# Patient Record
Sex: FEMALE | Race: WHITE
[De-identification: names, ages, dates, MRNs, and addresses within clinical notes are randomized per-mention and may not be internally consistent; named-entity substitution may affect disease eponyms.]

---

## 2017-05-05 ENCOUNTER — HOSPITAL ENCOUNTER (INPATIENT)
Dept: HOSPITAL 84 - D.LDO | Age: 27
LOS: 2 days | Discharge: HOME | End: 2017-05-07
Attending: OBSTETRICS & GYNECOLOGY | Admitting: OBSTETRICS & GYNECOLOGY
Payer: MEDICAID

## 2017-05-05 VITALS — SYSTOLIC BLOOD PRESSURE: 109 MMHG | DIASTOLIC BLOOD PRESSURE: 62 MMHG

## 2017-05-05 VITALS — DIASTOLIC BLOOD PRESSURE: 57 MMHG | SYSTOLIC BLOOD PRESSURE: 105 MMHG

## 2017-05-05 VITALS
BODY MASS INDEX: 42.59 KG/M2 | WEIGHT: 249.5 LBS | BODY MASS INDEX: 42.59 KG/M2 | HEIGHT: 64 IN | HEIGHT: 64 IN | WEIGHT: 249.5 LBS

## 2017-05-05 VITALS — DIASTOLIC BLOOD PRESSURE: 62 MMHG | SYSTOLIC BLOOD PRESSURE: 109 MMHG

## 2017-05-05 VITALS — DIASTOLIC BLOOD PRESSURE: 65 MMHG | SYSTOLIC BLOOD PRESSURE: 111 MMHG

## 2017-05-05 VITALS — SYSTOLIC BLOOD PRESSURE: 112 MMHG | DIASTOLIC BLOOD PRESSURE: 65 MMHG

## 2017-05-05 VITALS — DIASTOLIC BLOOD PRESSURE: 58 MMHG | SYSTOLIC BLOOD PRESSURE: 106 MMHG

## 2017-05-05 VITALS — DIASTOLIC BLOOD PRESSURE: 60 MMHG | SYSTOLIC BLOOD PRESSURE: 129 MMHG

## 2017-05-05 VITALS — DIASTOLIC BLOOD PRESSURE: 72 MMHG | SYSTOLIC BLOOD PRESSURE: 132 MMHG

## 2017-05-05 VITALS — SYSTOLIC BLOOD PRESSURE: 102 MMHG | DIASTOLIC BLOOD PRESSURE: 55 MMHG

## 2017-05-05 VITALS — SYSTOLIC BLOOD PRESSURE: 94 MMHG | DIASTOLIC BLOOD PRESSURE: 83 MMHG

## 2017-05-05 DIAGNOSIS — R00.0: ICD-10-CM

## 2017-05-05 DIAGNOSIS — Z3A.34: ICD-10-CM

## 2017-05-05 LAB
AMPHETAMINES UR QL SCN: NEGATIVE QUAL
APPEARANCE UR: (no result)
BARBITURATES UR QL SCN: NEGATIVE QUAL
BENZODIAZ UR QL SCN: NEGATIVE QUAL
BILIRUB SERPL-MCNC: NEGATIVE MG/DL
BZE UR QL SCN: NEGATIVE QUAL
CANNABINOIDS UR QL SCN: NEGATIVE QUAL
COLOR UR: YELLOW
ERYTHROCYTE [DISTWIDTH] IN BLOOD BY AUTOMATED COUNT: 13.7 % (ref 11.5–14.5)
GLUCOSE SERPL-MCNC: NEGATIVE MG/DL
HCT VFR BLD CALC: 36.1 % (ref 36–48)
HGB BLD-MCNC: 11.6 G/DL (ref 12–16)
KETONES UR STRIP-MCNC: (no result) MG/DL
LEUKOCYTE ESTERASE: NEGATIVE
MCH RBC QN AUTO: 30.2 PG (ref 26–34)
MCHC RBC AUTO-ENTMCNC: 32.1 G/DL (ref 31–37)
MCV RBC: 94 FL (ref 80–100)
NITRITE UR-MCNC: NEGATIVE MG/ML
OPIATES UR QL SCN: NEGATIVE QUAL
PCP UR QL SCN: NEGATIVE QUAL
PH UR STRIP: 7 [PH] (ref 5–6)
PLATELET # BLD: 299 10X3/UL (ref 130–400)
PMV BLD AUTO: 9.6 FL (ref 7.4–10.4)
PROT UR-MCNC: NEGATIVE MG/DL
RBC # BLD AUTO: 3.84 10X6/UL (ref 4–5.4)
SP GR UR STRIP: 1.02 (ref 1–1.02)
UDS - METH: NEGATIVE QUAL
UROBILINOGEN UR-MCNC: NORMAL MG/DL
WBC # BLD AUTO: 9.7 10X3/UL (ref 4.8–10.8)

## 2017-05-05 NOTE — NUR
DR WINTER NOTIFIED OF PT'S DECREASED URINE OUTPUT. ORDER REC'D TO INFUSE A
500ML BOLUS AND REPORT BACK.

## 2017-05-05 NOTE — NUR
IVAC SOUNDING. THIS RN TO BEDSIDE. 500ML BOLUS COMPLETE. PT RESTING W/EYES
CLSOED. RESP EVEN. NS W/20 UNITS PITOCIN RESTARTED AT 125ML/HR. 30ML URINE
NOTED IN UROMETER.

## 2017-05-05 NOTE — NUR
THIS RN TO BEDSIDE TO INITATE 500ML NS BOLUS PER MD ORDER. PT AWAKE, WITH
OCCASIONAL PERIODS OF DROWZINESS. PT CONTINUES TO RATE HER PAIN 5/10. PT HAS
PCA BUTTON AT SIDE AND CONTINUES TO PUSH IT FOR PAIN. V/S OBTAINED. SEE
FLOWSHEET. PT PERFORMS I.S  X 3. COUGHS W/POOR EFFORT. REPOSITIONS SELF UP IN
BED. UROMETER EMPTIED AT THIS TIME. FUNDUS FIRM,U/U, SMALL RUBRA LOCHIA NOTED.
CLEAN PERIPAD PLACED. SCD WRAPS REMAIN ON BILATERALLY. CONNECTED TO PUMP. PT
REMAINS ON. PT ENCOURAGED TO CONTINUE DRINK PO FLUIDS TO HELP WITH URINE
OUTPUT. PT IS AGREEABLE. PT'S BED LOW, SIDE RAILS UP X 2. CALL LIGHT AND PHONE
AT PT'S SIDE. SPOUSE IN ROOM. INFANT QUIET IN CRIB AT BEDSIDE.

## 2017-05-05 NOTE — NUR
THIS RN TO BEDSIDE. PT CURRENTLY SITTING UP IN BED W/INFANT UP IN ARMS.
CONTINUES TO RATE PAIN 5/10. PT CONTINUES TO USE PCA BUTTON. PT IFNORMED THAT
SHE MAY AHVE ADDITIONAL DOSE OF TORDAL IN 1 HR. URINE OUTPUT NOTED TO BE 60ML
TOTAL FOR PAST [id number]. PT DENEIS NEEDS AT THIS TIME.

## 2017-05-05 NOTE — NUR
THIS RN TO BEDSIDE. PT CURRENTLY RESTING W/EYES CLOSED IN HIGH REYES'S. I.S
PROVIDED W/TEACHING. PT PERFORMS I.S X 3 ABLE TO PULL 2000. PT COUGH W/POOR
EFFORT. HOB LOWERED. PT ROLLS FROM SIDE TO SIDE W/MODERATE ASSISTANCE. FUNDUS
FIRM,U/U. SMALL RUBRA LOCHIA NOTED TO PAD. PERICARE DONE. CLEAN PAD PLACED.
PT RETURNS TO HIGH REYES'S PER PREFERNCE FOR COMFORT. APPROX 20ML URINE
OUTPUT NOTED FOR PAST 1 HR. WILL CONTINUE TO MONITOR. PT'S CLEAR LIQUID
LUNCH TRAY PLACED IN FRONT OF HER. PT HAS DRANK APPROX 300ML WATER FROM [facility]
MUG. MUG REFILLED AND PLACED ON PT'S TABLE WITHIN HER REACH. NO NEEDS VOICED
AT THIS TIME.

## 2017-05-05 NOTE — NUR
DR. WINTER CALLED AND INFORMED OF PT. CURRENT STATUS AND INQUIRED IF PT.
COULD BEGIN ORAL MEDS AND START AMBULATION. INFORMED THAT ORAL PAIN MED ORDERS
WERE ALREADY ENTERED PER DR. FARNSWORTH. MD STATES TO PROCEDE.

## 2017-05-05 NOTE — NUR
ANITHA (LACTATION CONST) AND TU JEWELLRN AT BEDSIDE CURRENTLY ATTEMPTING TO
GET INFANT LATCHED FOR NURSING. PT REPORTS INCREASED PAIN WHEN SITTING UP. TU JEWELLRN PROVIDES PT W/PCA BUTTON SO PT MAY PUSH IT AT THIS TIME. APPROX
95ML URINE NOTED IN UROMETER AND DUMPED TO LEONE BAG. TORADOL 30MG SIVP GIVEN.
SEE EMAR.

## 2017-05-05 NOTE — NUR
PT. SITTING UP IN BED WITH HOB ELEVATED 45 DEGREES WITH MEAL TRAY IN FRONT OF
PT. PT. STATES SHE ONLY ATE JELLO. MORE OFFERED AND ACCEPTED. JELLO GIVEN TO
PT.  RATES PAIN AS A 2-3 OF 10 ON PAIN SCALE. BREATH SOUNDS CLEAR AND BOWEL
SOUNDS AUDIBLE. SCDS ON AND FUNCTIONAL. DEMONSTATED COUGHING AND DEEP
BREATHING AND ALSO USE OF INCENTIVE SPIROMETER AND ABLE TO ACHIEVE TO 2000. IV
NOTED IN RT WRIST AREA WITHOUT REDNESS NOR EDEMA AT IV SITE. IV OF NS WITH 20U
PITOCIN ADDED INFUSING AT 125CC/HR. INFANT IN OPEN CRIB AT BEDSIDE. PT. STATES
SHE STILL FEELS VERY DROWSY. FOB LYING ON SOFA. LIGHTS IN ROOM DIMMED. PT.
SPEAKING IN VERY LOW TONE. LEONE PATENT AND DRAINING WITH 65CC NOTED IN URINE
METER.

## 2017-05-05 NOTE — NUR
REC'D PT BACK FROM [facility] POST-OP PRIMARY  VIA BED. PT DROWZY,
FREQUENTLY SLEEPY. PT UNABLE TO VERBALIZE/REPORT A NUMBER FOR PAIN SCALE. FACE
SCALE USED AT THIS TIME. PAIN RATES 3/10 PER THIS RN. PT'S BREATHSOUNDS CL/=,
ABD SOFT,NON-DISTENDED. FUNDUS FIRM,U/U, SMALL RUBRA LOCHIA NOTED TO CURRENT
PERIPAD. CLEAN PAD PLACED AT THIS TIME. LOW TRANSVERSE INCISION W/LARGE ABD
DRESSING IN PLACE. C/D/I. LEONE CATH IN PLACE DRAINING VIA GRAVITY AT BEDSIDE.
APPROX 500ML NOTED IN BAG. BAG EMPTIED AT THIS TIME. PEDAL PULSES PRESENT X 2.
GENERALIZED, NON PITTING EDEMA NOTED TO LOWER EXTREMITIES. SCD WRAPS IN PLACED
BILATERALLY. CONNECTED TO PUMP. PUMP IS ON AND FUNTIONING.PT HAS A PIV TO RT
WRIST W/18GUAGE. NS W/20 UNITS PITOCIN CURRENTLY CONNECTED INFUSING AT SLOWR
ATE VIA GRAVITY. APPROX 200ML REMAINS IN BAG. BAG PLACED ON PUMP AT THIS TIME
TO INFUSE AT 125ML/HR. DEMEROL PCA CONNECTED TO IV SITE AND PCA PUMP SET PER
MD ORDERS. REPORT REC'D FROM RECOVERY NURSE.

## 2017-05-05 NOTE — NUR
THIS RN TO BEDSIDE. PT SITTING IN HIGH REYES'S MORE AWAKE AND ALERT, BUT
REPORT SHE STILL DOZES OFF OCCAISIONALLY. PT CONTINUES TO RATE HER PAIN 5/10
W/INCREASED PAIN W/MOVEMENT. PT DOES NOT APPEAR TO BE IN DISTRESS. PT HAS
DRANK APPROX 200ML OF WATER AND APPLE JUICE. HAS NO TOUCHED HER CLEAR LIQUID
LUNCH TRAY. APPROX 20ML URINE NOTED FOR THE PAST 1 HR. WILL REPORT TO ON CALL
MD. PT DENIES NEEDS AT PRESENT. FOB RETURNS TO ROOM AT THIS TIME.

## 2017-05-05 NOTE — NUR
THIS RN TO BEDSIDE. UPON ENTERING THE ROOM, PT IS RESTING QUIETLY IN HIGH
REYES'S W/EYES CLOSED. RESP EVEN AND UNLABORED. NO APPARENT DISTRESS NOTED.
PT AWAKENED FOR PAIN ASSESSMENT. PT REPORTS PAIN 3/10. STATES "BUT I'M STILL
HAVING CTX'S THAT ARE BAD." TORADOL 30MG SIVP GIVEN FOR PT C/O CRAMPING. PT
TURNS FROM SIDE TO SIDE W/ASSISTANCE. CLEAN CHUX AND TOWEL PLACED. FUNDUS
FIRM,U/1, SMALL LOCHIA W/1 QUARTER SIZED BLOOD CLOT NOTED. CLEAN PERIPAD
PLACED. APPROX 95ML URINE NOTED IN UROMETER. PT'S HOB ELEVATED TO 30 DEGREES.
BED LOW, SIDE RAILS UP X 2. CALL LIGHT AND PCA BUTTON AT PT'S SIDE.

## 2017-05-05 NOTE — NUR
PT BREASTFEEDING INFANT AT THIS TIME. MEL WRIGHT RN AT BEDSIDE ALSO. UPDATED
PT ON PLAN OF CARE, VERBALIZED UNDERSTANDING. INSTRUCTED TO USE CL TO NOTIFY
RN WHEN SHE COMPLETED BREASTFEEDING. PT NODDED IN AGREEMENT. PCA AND IV FLUIDS
TURNED OFF, WILL REMOVE LEONE AND SL PIV WHEN PT COMPLETES BREASTFEEDING. 3
MLS USED FROM PCA WITH 254 MLS IV FLUIDS INFUSED SINCE LAST CLEARED.

## 2017-05-05 NOTE — NUR
ROUNDS MADE. PT REQUESTING PAIN MEDICATION. PAIN 7/10. RN DISCUSSED DIFFERENT
STRENGTHS ON PERCOCET AND POSSIBLE SIDE EFFECTS. VERBALIZED UNDERSTANDING.
PERCOCET 10/325 TAKEN TO PT PER ORDER AND PT PAIN RATING AND REQUEST. UPON
ADMINISTERING MEDICATION PT REPORTS THAT "I AM SENSITIVE TO PAIN MEDICINE."
REQUESTS TO TAKE HALF OF PILL AT THIS TIME. PT BROKE PILL IN HALF AND STATES
THAT SHE IS AFRAID TO TAKE FULL PILL AT THIS TIME. HALF OF PILL WASTED WITH MEL WRIGHT RN IN THE SHARPS. NEW ICE PACK GIVEN AND APPLIED TO INCISION SITE,
DRSG REMAINS CLEAN, DRY, AND INTACT. ICE WATER GIVEN. LINENS AND PILLOW
PROVIDED TO S/O. DENIES ADDITIONAL NEEDS AT THIS TIME. REQUESTS INFANT BE
TAKEN TO NBN UNTIL TIME FOR NEXT FEEDING, THIS RN TAKES INFANT TO NBN. WILL
CONT TO MONITOR AND ASSIST PRN.

## 2017-05-05 NOTE — NUR
THIS RN TO BEDSIDE. PT CURRENTLY RESTING QUIETLY W/EYES CLOSED. RESP EVEN. PT
OPENS EYES SPONTANEOUSLY W/VERBAL STIMULUS. PAIN ASSESSED. PT RATES PAIN
2-3/10. V/S OBTAINED. SEE FLOWSHEET. APPROX 60ML URINE NOTED IN UROMETER AND
DUMPED INTO BAG. PT PERFORMS I.S. X3. COUGHS W/POOR EFFORT. FRESH ICE WATER
SERVED IN [facility] MUG.

## 2017-05-05 NOTE — NUR
PAIN REASSESSMENT COMPLETED. PT REPORTS THAT PAIN IS 4/10. STATES THAT
CRAMPING IS INTENSE AT TIMES. DENIES NEED FOR ADDITIONAL INTERVENTION AT THIS
TIME. S/O AT BEDSIDE, HOLDING PT HAND AT THIS TIME. BED IN LOW POSITION WITH
UPPER SIDE RAILS RAISED X2. CL AND PHONE WITHIN REACH. WILL CONT TO MONITOR
AND ASSIST PRN.

## 2017-05-05 NOTE — NUR
Keri Porter
16
 
 
O: Patient in bed holding infant, attempting first feeding, FOB at bedside,
nursery nurse in room to assist patient.
Explain to patient how to hold infant for feeding, turn tummy to tummy, nose
opposite of nipple, allow infant to self latch. Showed and explained how to
apply nipple shield correctly. Patient is drowsy due to meds. Explain to FOB
how important it is to stand by patient and help hold infant up, to make sure
infant doesn't fall, if patient relaxes her arms. Infant latches on the right
breast at 10:16, in laid back breastfeeding position, mouth 140 degrees,
sucking in a rocking motion, round checks, mother and infant both appear
content. Encouraged to allow infant to remain latch as long as he is willing
to suck, when infant comes off the breast, or completely falls asleep, try
stimulating infant by touch, burp and offer other breast. Patient states
verbally understands. Will follow up, CLC left patient in room with nursery
nurse.
 
 
A: Client delivered by , requested nipple shield, attempting infant
first feeding.
 
P: Continue to support breastfeeding.
 
Yolanda Herring, GOSIA

## 2017-05-05 NOTE — NUR
FUNDUS FIRM,U/U, SCANT LOCHIA NOTED. ICE CAP TO ABD. PT REMAINS DROWZY. NBN
NURSE TO ROOM W/INFANT AT THIS TIME.

## 2017-05-05 NOTE — NUR
THIS RN TO BEDSIDE. PT CURRENTLY HOLDING INFANT AFTER NURSING. PT CONTINUES TO
BE DROWZY. INFANT TRANSFERED TO CRIB TO BE SWADDLED THEN PLACED IN FOB'S ARMS.
PT RATES PAIN 5/10. PT HAS BEEN INSTRUCTED ON HOW TO USE HER PCA BUTTON AND
HAS BEEN PUSHING IT. FUNUDS FIRM,U/U, SMALL RUBRA LOCHIA NOTED ON PERIPAD. PAD
CHANGED AT THIS TIME. APPROX 20ML URINE NOTED IN UROMETER. PT ENCOURAGED TO
INCREASE WATER INTAKE. V/S OBTAINED AND RECORDED. SEE FLOWSHEET. PT INFORMED
THAT ONCE SHE BECOMES MORE ALERT, INCENTIVE SPIROMETER TEACHING WILL BE GIVEN
AND PT WILL HAVE TO BEGIN T/C/D. PT'S SPOUSE REMAINS AT BEDSIDE. ICE CAP
FRESHENED AND PLACED OVER INCISION SITE. FRESH ICE WATER SERVED AND PLACED AT
PT'S BEDSIDE. PT BED IN LOW POSITION. SIDE RAILS UP X 2. CALL LIGHT, PHONE AND
PCA BUTTON AT PT'S SIDE.

## 2017-05-05 NOTE — NUR
PT CONTINUES TO REPORT "BAD CRAMPING" TO LOWER ABD. DISCUSSED ADDITIONAL DOSE
OF TORADOL PER ORDERS. PT AGREEABLE. MEDICATION FREQUENY AND SIDE EFFECTS
DISCUSSED WITH PT AND S/O, VERBALIZED UNDERSTANDING, STATES THAT SHE HAS HAD
MEDICATION PREVIOUSLY AND IT DID HELP WITH PAIN GREATLY. NBN RN BRINGS INFANT
TO ROOM FOR BREASTFEEDING. RN ASSISTED PT WITH SCOOTING UP IN BED FOR BREAST
FEEDING, STATES THAT SHE ALSO HAS DISCOMFORT TO COCCYX. PILLOW PLACED UNDER
LEFT HIP TO ASSIST WITH DISPLACING PRESSURE, VERBALIZED RELIEF. S/O REMAINS AT
BEDSIDE, HOLDING INFANT. ASSISTANCE WITH BREASTFEEDING OFFERED AND DECLINED,
PT REPORTS THAT SHE WILL NOTIFY RN IF ASSISTANCE IS NEEDED WITH CL. BED IN LOW
POSITION WITH UPPER SIDE RAILS RAISED X2. CL AND PHONE WITHIN REACH. S/O
REMAINS AT BEDSIDE. ICE WATER PROVIDED FOR S/O PER REQUEST.

## 2017-05-05 NOTE — NUR
PT REMAINS DROWZY, FACE SCALE CONTINUED TO BE USED FOR PAIN ASSESSMENT.
 PAIN ASSESSED AT  3/10. FUNDUS FIRM,U/U, SCANT LOCHIA NOTED. V/S STABLE.

## 2017-05-05 NOTE — NUR
PT CALLED OUT VIA CL, STATES THAT SHE HAS FINISHED BREASTFEEDING. MEL WRIGHT RN TO BEDSIDE ALSO. JAYDEN CASTILLO SL PIV. MEL WRIGHT RN REMOVED LEONE WITH NO
DIFFICULTY, 165 MLS OUT FROM LEONE NOTED. PT TOLERATED WELL. DINNER TRAY
REMOVED FROM ROOM. S/O REMAINS AT BEDSIDE, SUPPORTIVE OF PT. PT DENIES
ADDITIONAL NEEDS AT THIS TIME. PT WITH ICE PACK ON TOP OF GOWN TO LOWER ABD
COVERING INCISION SITE. BED IN LOW POSITION WITH UPPER SIDE RAILS RAISED X2.
CL AND PHONE WITHIN PT REACH. S/O REMAINS AT BEDSIDE.

## 2017-05-06 VITALS — SYSTOLIC BLOOD PRESSURE: 98 MMHG | DIASTOLIC BLOOD PRESSURE: 55 MMHG

## 2017-05-06 VITALS — SYSTOLIC BLOOD PRESSURE: 95 MMHG | DIASTOLIC BLOOD PRESSURE: 50 MMHG

## 2017-05-06 VITALS — SYSTOLIC BLOOD PRESSURE: 116 MMHG | DIASTOLIC BLOOD PRESSURE: 66 MMHG

## 2017-05-06 VITALS — DIASTOLIC BLOOD PRESSURE: 61 MMHG | SYSTOLIC BLOOD PRESSURE: 102 MMHG

## 2017-05-06 VITALS — DIASTOLIC BLOOD PRESSURE: 60 MMHG | SYSTOLIC BLOOD PRESSURE: 107 MMHG

## 2017-05-06 LAB
ERYTHROCYTE [DISTWIDTH] IN BLOOD BY AUTOMATED COUNT: 13.4 % (ref 11.5–14.5)
HCT VFR BLD CALC: 32.8 % (ref 36–48)
HGB BLD-MCNC: 10.6 G/DL (ref 12–16)
MCH RBC QN AUTO: 29.9 PG (ref 26–34)
MCHC RBC AUTO-ENTMCNC: 32.3 G/DL (ref 31–37)
MCV RBC: 92.7 FL (ref 80–100)
PLATELET # BLD: 242 10X3/UL (ref 130–400)
PMV BLD AUTO: 9.5 FL (ref 7.4–10.4)
RBC # BLD AUTO: 3.54 10X6/UL (ref 4–5.4)
WBC # BLD AUTO: 18.7 10X3/UL (ref 4.8–10.8)

## 2017-05-06 NOTE — NUR
ROUNDS MADE. INFANT IN CRIB AT BEDSIDE. PT IN HIGH FOWLERS POSITION WATCHING
TV. PAIN 2-3/10. RN ASSISTED PT UP TO VOID. LINEN CHANGE DONE. ENCOURAGED PT
TO SHOWER, DECLINED AT THIS TIME. STATES THAT SHE JUST WANTS TO GO TO SLEEP.
PT REPORTS THAT SHE IS GOING TO TAKE INFANT TO NBN AND WALK BACK TO ROOM TO
REST. S/O SUPPORTIVE OF PT. WILL CONT TO MONITOR AND ASSIST PRN.

## 2017-05-06 NOTE — NUR
Pt and spouse amb in hallway with pt pushing infant in crib, she is able to
walk the entire length of unit hallway.  Rates her pain at 2/10 at this time.
Back to room with spouse and infant.

## 2017-05-06 NOTE — NUR
To room for am assessment, pt is awake at this time.  When asked for her pain
level she states "ok as long as I don't move." when asked for a number of 0-10
as on pain scale she states "maybe a 4".  VS as charted on graphic and
assesment completed on flowsheet. Abd soft to touch, fundus firm with massage
at u/u with light bleeding to dago pad.  Pt states that yes she is passing gas
and has not noticed any clots with voids.  Encouraged her to try and get up to
void every couple of hours this will help with pain control, she states her
understanding to this and for her to call for nurse is any assistance is
needed getting up from bed.  IV saline to right wrist patent and no reddness
and denies pain with touch. SCD on bilat lower extremities and pump is on.
Denies any questions or concerns at this time. Call light is with in her reach
and side rails up x 2.

## 2017-05-06 NOTE — NUR
Pt sitting up in chair at bedside holding infant. Denies any needs at this
time. Call light and phone within patients reach and spouse at bedside.

## 2017-05-06 NOTE — NUR
RN TO BEDSIDE. PT REQUESTING ASSISTANCE WITH BREASTFEEDING, STATES THAT SHE
CAN'T GET INFANT TO WAKE UP. NBN RN TO BEDSIDE TO ASSIST WITH BREASTFEEDING.
PT REQUESTS TO CONTINUE TO WAIT FOR ASSESSMENT. RN WILL CONT TO MONITOR. S/O
REMAINS AT BEDSIDE. BED IN LOW POSITION WITH UPPER SIDE RAILS RAISED X2. CL
AND PHONE WITHIN REACH.

## 2017-05-06 NOTE — NUR
PT CALLS VIA CL, REQUEST THAT INFANT BE TAKEN TO NBN. SCD'S PLUGGED BACK IN.
S/O NOT AT BEDSIDE AT THIS TIME. PT DENIES NEEDS. STATES THAT SHE IS GOING TO
REST. BED IN LOW POSITION WITH UPPER SIDE RAILS RAISED X2. CL AND PHONE
WITHIN REACH. PT VERBALIZED THAT SHE WILL USE CL FOR ASSISTANCE IF NEEDED.
WILL CONT TO MONITOR AND ASSIST PRN.

## 2017-05-06 NOTE — NUR
PT. REQUESTED INFANT RETURN TO NBN SO SHE CAN SLEEP.  CURRENTLY POSITIONED IN
RT TILT SUPPORTED WITH PILLOWS.

## 2017-05-06 NOTE — NUR
PT. CONTINUES TO SIT ON SIDE OF BED. REQUESTED PAIN MED FOR PAIN SHE RATES AS
A 5 OF 10 ON PAIN SCALE. PAIN MED GIVEN AS REQUESTED.

## 2017-05-06 NOTE — NUR
REPORT REC'D FROM YAJAIRA TURCIOS RN. PT IN SEMI FOWLERS POSITION RESTING WITH EYES
CLOSED. RESPIRATIONS REGULAR, NO S/S OF DISTRESS NOTED. SPOUSE AT BEDSIDE,
REQUESTS THAT RN LET PT CONTINUE TO REST, STATES "SHE JUST WENT TO SLEEP ABOUT
30 MINUTES AGO." INSTRUCTED SPOUSE TO NOTIFY RN OF NEEDS, VERBALIZED
UNDERSTANDING. BED IN LOW POSITION WITH UPPER SIDE RAILS RAISED X2. CL AND
PHONE WITHIN REACH. WILL CONT TO MONITOR AND ASSIST PRN.

## 2017-05-06 NOTE — NUR
Pt calls for nurse, this rn to room.  Ask for pain med for incision pain that
she rates at 5/10. Also ask for Abd Binder so that she and spouse can walk in
halls.

## 2017-05-06 NOTE — NUR
Infant taken to nbn via crib for Pedi assessment.  Pt is sitting up in bed
visiting with friends, when asked about pain level pt responds with "its ok"
reminded her that she does have meds for pain control which are not scheduled
but something she will need to let nurses know she needs, responds with
nodding her head.  Side rails up x 2 with call light in reach.

## 2017-05-06 NOTE — NUR
INTO PT. ROOM TO ASSIST PT. TO GET UP TO BATHROOM. PT. USING INCENTIVE
SPIROMETER WHEN NURSES ENTERED ROOM. PT. MORE AWAKE AND ALERT AT THIS TIME
THAN EARLIER IN SHIFT. SPEAKING TONE OF PT. LOUDER. STATES SHE FEELS URGE TO
VOID. TO SITTING POSITION ON SIDE OF BED WITH MINIMAL ASSISTANCE AND USING
PILLOW TO SPLINT ABD.  STANDING AT SIDE OF BED AND AMBULATED TO BATHROOM WITH
MINIMAL ASSISTANCE. STEADY, ALTHOUGH, SLOW GAIT. VOIDED 850CC. ASSISTED PT.
WITH PARISH CARE AND ASSISTED WITH PARISH PADS AND PANTIES APPLIED. BACK TO BED
AND POSITIONED TO LT SIDE AND SUPPORTED WITH PILLOW. SCDS REAPPLIED AND
FUNCTIONAL. PT. RATES PAIN AS A 4 OF 10 AFTER TORADOL INJECTION BUT REMARKS
THAT MEDICATION HELPED. ABD. DRESSING DRY AND INTACT AND LOCHIA RUBRA SCANT TO
MOD.  FOB REMAINS IN ROOM WITH PT.  ENCOURAGED TO CALL FOR ASSISTANCE NEXT
TIME SHE FELT SHE NEEDED TO VOID. SIDE RAILS UP X 2.

## 2017-05-06 NOTE — NUR
PT REPORTS THAT SHE COMPLETED BREASTFEEDING AND ALSO USED BREAST PUMP. SHIFT
ASSESSMENT COMPLETED. VSS. FUNDUS FIRM U2, SCANT RUBRA LOCHIA TO PERIPAD, NO
CLOTS PRESENT. PT REPORTS THAT BLEEDING HAS BEEN LIGHT ALL DAY. PERIPAD
PRESENT BETWEEN ABD FOLD AND INCISION, INCISION CLEAN, DRY, AND WELL
APPROXIMATED WITH STERISTRIPS INTACT, NO DRAINAGE PRESENT TO PERIPAD. BOWEL
SOUND PRESENT AND ACTIVE X4 QUADRANTS, REPORTS THAT SHE HAS BEEN PASSING
FLATUS. VOIDING WITHOUT DIFFICULTY. PAIN 2/10 INTERMITTENT ABD CRAMPING WITH
STINGING TO INCISION. DENIES NEED FOR PAIN MEDICATION OR INTERVENTION. SCD'S
ON. S/O AT BEDSIDE. BED IN LOW POSITION WITH UPPER SIDE RAILS RAISED X2. CL
AND PHONE WITHIN REACH. WILL CONT TO MONTIOR AND ASSIST PRN.

## 2017-05-06 NOTE — NUR
Pt up to void with, assistance needed getting up from bed.  Pt able to amb to
bathroom and preform incision care per herself.  Toothbrush/paste provided,
explained that she could shower whenever she wanted, states she was waiting
for her  to return.

## 2017-05-06 NOTE — NUR
Pain med given as charted on emar for abd/incision pain that she rates at
7/10. Pt is tearful at this time and questions "when will I feel better"
attempt to reassure her that her recovery time will vary but each day she will
notice difference, she does smile and nod her head.  Father of baby at bedside
and ask if nursery nurse comes to room to answer questions about breast
feeding.  Pt denies any other needs at this time. Side rails up x 2 with phone
and call light in reach.

## 2017-05-06 NOTE — NUR
PT. REQUESTED ASSISTANCE IN GETTING INFANT LATCHED FOR BREASTFEEDING. REPORTS
THAT SHE HAS BEEN USING BREAST SHIELD BUT BREAST ARE NOW SORE AND SHE DOES NOT
WANT TO USE. NIPPLES NOTED TO BE FLAT AND BREAST LARGE. ATTEMPTED TO ASSIST
PT. BUT INFANT UNABLE TO GET LATCHED. PT. SWITCHED BREAST AND NIPPLE ROLLED IN
ORDER TO GET NIPPLE MORE ERECT BUT INFANT STILL UNABLE TO LATCH DISPITE
NUMEROUS ATTEMPTS BY INFANT. SUGGESTED PT. MIGHT TRY PUTTING SHIELD ON UNTIL
INFANT NURSES LONG ENOUGH TO GET NIPPLE OUT FURTHER , THEN REMOVE SHIELD. PT.
REPORTS "IT IS JUST TOO PAINFUL". INQUIRED IF PT. HAD ANY DIFFICULTY WITH HER
PAST CHILDREN GETTING A GOOD LATCH. PT. REPORTS THAT SHE DID. PT. STATES "WE
WILL GET IT FIGURED OUT". ALSO ENCOURAGED PT. TO [employer] MILK TO NIPPLE
FOR INFANT TO TASTE AND PT. DID SAME BUT INFANT STILL UNABLE TO LATCH.

## 2017-05-06 NOTE — NUR
PAIN REASSESSMENT COMPLETED. 0/10. PT RESTING WITH EYES CLOSED, RESPIRATIONS
REGULAR, NO S/S OF DISTRESS NOTED. WILL CONT TO MONITOR. S/O REMAINS AT
BEDSIDE. BED IN LOW POSITION WITH UPPER SIDE RAILS RAISED X2. CL AND PHONE
WITHIN REACH.

## 2017-05-06 NOTE — NUR
Pain med as charted on emar with large ice water per her request.  Fitted for
abd binder and shown how to wear, encouraged her to wear when up out of bed
but remove when she gets back in bed.  After binder is placed she does state
that she feels more support.  Pt and spouse amb out of room with pt pushing
infant in crib, she is able to wall about half of unit hallway than back to
room.  Towels, dago pad/panties supplied and placed in bathroom for when she
is ready to shower, ask that nurse be called when she does this so that linens
on bed may be changed.

## 2017-05-06 NOTE — NUR
PT BACK TO ROOM FOLLOWING AMBULATING AROUND UNIT IN THE FARLEY. PAIN 5/10,
STINGING AND ACHING TO ABD INCISION CRAMPING. PT REQUESTS PAIN MEDICATION AND
TO TAKE MOTRIN WITH PRECOCET. PT C/O PAIN TO PIV SITE. REDNESS AND WARMTH
NOTED TO SITE, UNABLE TO FLUSH, PIV REMOVED. PT REFUSED RESITE. ICE WATER AND
PUDDING GIVEN. DENIES ADDITIONAL NEEDS AT THIS TIME. S/O AT BEDSIDE AND
SUPPORTIVE OF PT. BED IN LOW POSITION WITH UPPER SIDE RAILS RAISED X2. CL AND
PHONE WITHIN REACH. WILL CONT TO MONITOR AND ASSIST PRN.

## 2017-05-06 NOTE — NUR
Infant in nursery at this time, pt resting with eyes closed, responds to her
name being spoken. Denies needs at this time and states she just want to sleep
and will call if she needs anything. Side rails up x 2 with call light in
reach.

## 2017-05-06 NOTE — NUR
AMB. TO BATHROOM WITH MINIMAL ASSISTANCE. GAIT STEADY BUT SLOW. VOIDED AND
PARISH PAD CHANGED. LOCHIA RUBRA SCANT. PT. HOLDS PILLOW TO ABD. TO SPLINT WHEN
AMBULATORY. PT. SITTING ON SIDE OF BED. DESIRES TO JUST SIT ON BED ON OTHER
CHILDREN ARE IN ROOM. INFANT IN OPEN CRIB. ABD. DRESSING DRY AND INTACT.

## 2017-05-06 NOTE — NUR
ASSISTED UP TO BATHROOM. GAIT STEADY. VOIDED 700CC. PT. BACK TO SITTING ON
SIDE OF BED AND STATES SHE DESIRES TO SIT FOR AWHILE. ICE WATER PROVIDED AND
ICE CAP REFILLED. PT. USING INCENTIVE SPIROMETER AND ENCOURAGED TO TAKE DEEP
BREATHS. FOB AT BEDSIDE.

## 2017-05-07 VITALS — DIASTOLIC BLOOD PRESSURE: 64 MMHG | SYSTOLIC BLOOD PRESSURE: 106 MMHG

## 2017-05-07 VITALS — DIASTOLIC BLOOD PRESSURE: 57 MMHG | SYSTOLIC BLOOD PRESSURE: 108 MMHG

## 2017-05-07 VITALS — SYSTOLIC BLOOD PRESSURE: 103 MMHG | DIASTOLIC BLOOD PRESSURE: 58 MMHG

## 2017-05-07 NOTE — NUR
PT CALLS VIA CL. REQUEST THAT INFANT BE TAKEN TO NBN FOR HER TO SHOWER. RN
OFFERED ASSISTANCE, PT REQUESTS THAT SPOUSE ASSIST WITH SHOWER. LINEN CHANGE
COMPLETED.

## 2017-05-07 NOTE — NUR
PT CALLS VIA CL, STATES THAT SHE HAS COMPLETED BREASTFEEDING AND PUMPING.
ROOMING IN AGREEMENT DISCUSSED WITH PT AND S/O. VERBALIZE UNDERSTANDING. COPY
OF AGREEMENT GIVEN TO PT, PT READS AGREEMENT AND SIGNS. DENIES QUESTIONS AT
THIS TIME.

## 2017-05-07 NOTE — NUR
S/O TO DESK, REPORTS THAT PT COMPLETED PUMPING. VSS. PAIN 3/10 AT THIS TIME.
DENIES NEED FOR PAIN MEDICATION CURRENTLY. FUNDUS FIRM, U2, SCANT RUBRA
LOCHIA, NO CLOTS PRESENT.

## 2017-05-07 NOTE — NUR
AROUSED FROM SLEEP FOR BREASTFEEDING.  DENIES NEEDING ANYTHING AT THIS TIME.
WILL COMPLETE SHIFT ASSESSMENT WHEN FINISHED AND AFTER BREAKFAST.  SIDE RAILS
UP X 2, CALL LIGHT PLACED IN REACH.

## 2017-05-07 NOTE — NUR
REQUESTED PAIN MEDICATION FOR 3-4 INCISIONAL PULLING PAIN WITH MOVEMENT.
DISCUSSED MEDICATION OPTIONS.  SELECTED MOTRIN AT THIS TIME.  INSTRUCTED IF
PAIN NOT RELIEVED CAN RECEIVE PERCOCET.  DISCUSSED PAIN MEDICATIONS SHE WILL
BE DC'D HOME WITH.  SITTING UP IN BED.  FINISHED BREAKFAST.  INFANT IN
NURSERY. FOB IN ROOM.  SIDERAILS UP X 2, CALL LIGHT IN REACH.

## 2017-05-07 NOTE — NUR
SITTING UP EATING DINNER WITH FOB.  HAS PUMPED BUT HAS NOT BREASTFEED INFANT.
PEDIATRICIAN IS WAITING ON PT TO BREASTFEED BEFORE DETERMINING DC STATUS.
RECOMMENDED TO SPOUSE THAT HE  PRESCRIPTIONS FROM PHARMACY SO PT CAN BE
DC'D TO ROOMING-IN STATUS IN CASE INFANT IS NOT DC'D HOME.  STATES "OUR
PHARMACY IS 24 HOURS" PLANS TO  RX AFTER EATING.

## 2017-05-07 NOTE — NUR
REQUESTED PAIN MEDICATION FOR 4/10 INCISIONAL PAIN.  DESIRED MOTRIN.  MOTRIN
600 MG GIVEN PO AT THIS TIME.  NOTED SCANT DROP OF BLOOD ON LEFT END PERIPAD
THAT IS COVERING INCISION.  UPON INSPECTION NOTED STERI-STRIP AT LEFT END
SLIGHTLY FOLDED BACK AND IN CREASE ON INCISION LINE. SLIGHT BLEEDING PROBABLY
SECONDARY TO RUBBING FROM STERISTRIP. NO INCISIONAL SEPERATION NOTED BUT
REINFORCED AREA WITH VERTICLE.  AGAIN INSTRUCTED PATIENT THAT STRIP WILL
LOOSEN OVER NEXT FEW DAYS AND SHOULD BE REMOVED IN 7 DAYS IF THEY HAVE NOT
COME OFF.  VERBALIZED UNDERSTANDING.  SIDE RAILS UP X 2.  INFANT IN ROOM WITH
FOB.  ANTICIPATE DC OF INFANT LATE THIS AFTERNOON.

## 2017-05-07 NOTE — NUR
SITTING UP IN BED PUMPING LEFT BREAST, INFANT LAYING IN BED.  SHIFT ASSESSMENT
COMPLETED.  PASSING GAS BUT NO BM YET.  DENIES PAIN AT THIS TIME.  DENIES
THOUGHTS OF HURTING SELF OR ANYONE ELSE.  DECLINES TDAP VACCINATION.
NON-SMOKER. HAS BREAST PUMP AT HOME.  READY TO GO HOME.  OVERVIEW OF DC
INSTRUCTIONS STARTED AT THIS TIME.  NO REQUESTS.  SIDERAILS UP X 2, CALL LIGHT
IN REACH.

## 2017-05-07 NOTE — NUR
PT PREPARING TO BREAST FEED INFANT. DENIES NEEDS AT THIS TIME. WILL CONT TO
MONITOR AND CHECK V/S WHEN PT COMPLETES BREAST FEEDING AND BONDING WITH
INFANT. DENIES PAIN AT THIS TIME. S/O REMAINS AT BEDSIDE. CL AND PHONE WITHIN
REACH. BED IN LOW POSITION WITH UPPER SIDE RAILS RAISED X2.

## 2017-05-07 NOTE — NUR
SITTING UP IN BED CURRENTLY PUMPING.  STATES "HE ATE 10 MINUTES ON EACH SIDE".
INFANT IN BED BETWEEN MOTHER'S LEGS SLEEPING.  NO REQUESTS AT THIS MOMENT.  TO
CALL IF ANYTHING IS NEEDED.   PLAN ROOMING-IN IF INFANT NOT DC'D TODAY.

## 2017-05-07 NOTE — NUR
RETURNED FROM BATHROOM AND NOW PUMPING.  SAYS HER INCISIONAL PAIN IS ABOUT A
4/10.  OFFERED PERCOCET WHICH SHE DESIRES.  PERCOCET 5 MG GIVEN FOR RELIEF.
INSTRUCTED THAT SHE MAY FEEL/EXPERIENCE SOME PULLING AND BURNING WHEN SHE
MOVES.  VERBALIZED UNDERSTANDING.  WAITING ON FEEDING ASSESSMENT BY
PEDIATRICIAN PRIOR TO INFANT DC ORDER.  JANI FELIX RN, NURSERY WILL TALK TO
PEDIATRICIAN.  SIDE RAILS UP X 2, FOB AND INFANT IN ROOM.  CALL LIGHT IN
REACH.

## 2017-05-07 NOTE — NUR
PT USING BREAST PUMP AT THIS TIME. STATES THAT INFANT NURSED FOR 20 MINUTES.
WILL CALL RN TO ROOM USING CL ONCE SHE COMPLETES PUMPING. DENIES NEEDS AT THIS
TIME.

## 2017-05-07 NOTE — NUR
SITTING UP IN BED.  SAYS HER PAIN IS 2/10 CURRENTLY.  REQUESTED FRESH WATER.
NO ADDITIONAL REQUESTS.  DC PLAN AND TEACHING WAS COMPLETED EARLIER.  PT HAS
DC INSTRUCTIONS AND PRESCRIPTIONS.  WAITING ON INFANT DC TO DISCHARGE PT.

## 2017-05-07 NOTE — NUR
REPORT REC'D FROM DENISE CHANEL RN. D/C PAPERWORK AND INSTRUCTIONS GIVEN TO PT
DURING PREVIOUS SHIFT. S/O LEFT HOSPITAL TO GO GET PRESCRIPTIONS FILLED FOR
PT. PT BREASTFEEDING INFANT AT THIS TIME. INSTRUCTED TO NOTIFY RN WHEN
BREASTFEEDING COMPLETED FOR RN TO REVIEW ROOMING IN AGREEMENT. VERBALIZED
UNDERSTANDING.

## 2017-05-07 NOTE — NUR
SHOWER COMPLETED. PAIN 5/10 STINGING AND ACHING TO INCISION AND INTERMITTENT
ABD CRAMPING. PT REQUESTS PAIN MEDICATION. PERCOCET GIVEN PER ORDER AND
REQUEST. ICE WATER GIVEN. S/O REMAINS AT BEDSIDE. PT DENIES ADDITIONAL NEEDS.
BED IN LOW POSITION WITH UPPER SIDE RAILS RAISED X2. CL AND PHONE WITHIN PT
REACH. PT STATES THAT SHE IS GOING TO SLEEP UNTIL NEXT FEEDING AND REQUESTS
NOT TO BE WOKE UP IF SHE IS SLEEPING.

## 2017-05-07 NOTE — NUR
SITTING UP IN BED HOLDING INFANT.  DC INSTRUCTIONS COMPLETED.  QUESTIONS
ANSWERED.  INFANT NOT CURRENTLY BEING DC'D SO WILL PLAN DC HOME WITH INFANT.
IF INFANT STAYS UNTIL TOMORROW WILL CONTACT MD FOR ROOMING-IN ORDER.  SAYS HER
PAIN IS BETTER AT 4-3/10 DENIES NEEDING ADDITIONAL MEDICATION AT THIS TIME.
INSTRUCTED TO CALL IF SHE NEEDS ADDITIONAL PAIN MED.  VERBALIZED
UNDERSTANDING.  VISITORS X 2 IN ROOM.  CALL LIGHT IN REACH.

## 2017-05-07 NOTE — NUR
ROUNDS MADE. PT BONDING WITH INFANT AT THIS TIME. PAIN 1-2/10, ABD CRAMPING.
PT REPORTS THAT CRAMPING INCREASED WHILE SHE WAS BREAST FEEDING BUT DENIES
NEED FOR INTERVENTION AT THIS TIME. DENIES NEEDS. BED IN LOW POSITION WITH
UPPER SIDE RAILS RAISED X2. CL AND PHONE WITHIN REACH. PT REPORTS THAT SPOUSE
HAD TO LEAVE GO TAKE CARE OF OLDER CHILDREN BUT WILL RETURN AROUND 0900. WILL
CONT TO MONITOR AND ASSIST PRN.

## 2017-05-08 NOTE — OP
PATIENT NAME:  NAN BEAVERS                           MEDICAL RECORD: [id number]
:88                                             LOCATION:[facility]     [id number]
                                                         ADMISSION DATE:17
SURGEON:  DANIELLA FARNSWORTH MD             
 
 
DATE OF OPERATION:  2017
 
PREOPERATIVE DIAGNOSIS:  Nonreassuring fetal trace.
 
POSTOPERATIVE DIAGNOSIS:  Nonreassuring fetal trace.
 
PROCEDURE:  Primary low transverse  section.
 
SURGEON:  Daniella Farnsworth MD
 
ANESTHESIA:  Spinal.
 
FINDINGS:  A 5 pound 6.5 ounce male infant in cephalic presentation with 9 and 9
Apgars.
 
ESTIMATED BLOOD LOSS:  800 cc.
 
COMPLICATIONS OF SURGERY:  None.
 
OPERATIVE NOTE:  The patient was taken to the OR and under adequate spinal
anesthesia, prepped and draped in the usual manner for abdominal procedures.  A
transverse incision was made in the lower abdomen and extended through
subcutaneous tissue, fascia, dividing muscles in the midline in the Pfannenstiel
manner.  Peritoneum was then elevated and incised and this incision extended
from the symphysis pubis to within 6 cm of the umbilicus, avoiding the bladder
and abdominal organs.  A transverse incision was then made in the lower uterine
segment and infant was delivered easily through the incision from a cephalic
presentation.  The infant was suctioned, cord doubly clamped and ligated and the
infant handed to waiting nursery personnel.  Placenta was removed manually.  The
uterus was cleansed of blood clots with clean laps.  A lower uterine segment
incision was closed in two layers, first layer running interlocking #1 chromic,
second layer imbricating #1 chromic.  Sponge and needle counts were correct. 
Hemostasis was good.  Gregory was applied to the lower uterine segment incision. 
The fascial layer was closed in a running noninterlocking #1 PDS loop suture. 
Skin incision was closed in 2 layers, first layer a running 2-0 plain gut
suture.  Skin incision was closed with subcuticular 2-0 plain gut suture. 
Dermabond was applied, a Steri-Strip dressing was applied and the patient went
to the recovery area in good condition.
 
TRANSINT:[id number] Voice Confirmation ID: [id number] DOCUMENT ID: [id number]
                                           
                                           DANIELLA FARNSWORTH MD             
 
 
 
Electronically Signed by DANIELLA FARNSWORTH on 17 at 0747
CC:                                                             [phone]
DICTATION DATE: 17 1004     :     17 190      DIS IN  
                                                                      17
[facility]                                          
[address] [place] [address]

## 2017-08-11 NOTE — NUR
Problem: Patient Care Overview  Goal: Plan of Care Review  Outcome: Ongoing (interventions implemented as appropriate)  Pts  safety maintained,  at bedside. Fluids infusing. Incision sites CDI. No complains of pain, SOB, N/V distress. Vitals remain stable.        ROUNDS MADE. PT SITTING UP IN HIGH REYES'S VISITING W/GUESTS. PAIN ASSESSED.
PT REPORTS PAIN 3-4/10. NO ADDITIONAL PAIN INTERVENTIONS REQUESTED AT THIS
TIME. I&O COLLECTED. PUMP CLEARED. APPROX 60ML URINE OUTPUT NOTED IN UROMETER
AND DUMPED INTO LEONE BAG. PT CURRENTLY ATTEMPTING TO COMSUME LIQUID DIET
DINNER TRAY SERVED. PT DENIES NEEDS AT PRESENT.

## 2018-03-22 NOTE — NUR
NBN RN TO ROOM WITH INFANT. PT AWAKE AND HOLDING INFANT WHEN RN ENTERED ROOM.
PLAN OF CARE DISCUSSED WITH PT. VERBALIZED UNDERSTANDING. STATES THAT SHE WILL
BE BREASTFEEDING "IN A LITTLE BIT." REQUEST TIME TO BOND WITH INFANT PRIOR TO
ASSESSMENT AND FOR RN TO COMPLETE ASSESSMENT FOLLOWING BREAST FEEDING. S/O
REMAINS AT BEDSIDE. WILL CONT TO MONITOR. INSTRUCTED PT TO NOTIFY RN WHEN SHE
COMPLETED BREAST FEEDING, VERBALIZED UNDERSTANDING. PAIN 2/10 AT THIS TIME.
FRESH ICE WATER GIVEN, DENIES ADDITIONAL NEEDS. No

## 2018-04-13 ENCOUNTER — HOSPITAL ENCOUNTER (EMERGENCY)
Dept: HOSPITAL 84 - D.ER | Age: 28
Discharge: HOME | End: 2018-04-13
Payer: SELF-PAY

## 2018-04-13 VITALS — BODY MASS INDEX: 42.9 KG/M2
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
## 2018-04-13 DIAGNOSIS — S00.86XA: Primary | ICD-10-CM
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
## 2018-04-13 DIAGNOSIS — L03.211: ICD-10-CM
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
## 2018-10-23 ENCOUNTER — HOSPITAL ENCOUNTER (EMERGENCY)
Dept: HOSPITAL 84 - D.ER | Age: 28
Discharge: HOME | End: 2018-10-23
Payer: MEDICAID

## 2018-10-23 VITALS — DIASTOLIC BLOOD PRESSURE: 55 MMHG | SYSTOLIC BLOOD PRESSURE: 111 MMHG

## 2018-10-23 VITALS
HEIGHT: 64 IN | BODY MASS INDEX: 38.07 KG/M2 | BODY MASS INDEX: 38.07 KG/M2 | HEIGHT: 64 IN | WEIGHT: 223 LBS | WEIGHT: 223 LBS

## 2018-10-23 DIAGNOSIS — Z3A.00: ICD-10-CM

## 2018-10-23 DIAGNOSIS — O20.9: Primary | ICD-10-CM

## 2018-10-23 DIAGNOSIS — R10.30: ICD-10-CM

## 2018-10-23 LAB
ALBUMIN SERPL-MCNC: 3.9 G/DL (ref 3.4–5)
ALP SERPL-CCNC: 95 U/L (ref 46–116)
ALT SERPL-CCNC: 25 U/L (ref 10–68)
ANION GAP SERPL CALC-SCNC: 15.3 MMOL/L (ref 8–16)
APPEARANCE UR: CLEAR
BACTERIA #/AREA URNS HPF: (no result) /HPF
BASOPHILS NFR BLD AUTO: 0.3 % (ref 0–2)
BILIRUB SERPL-MCNC: 0.16 MG/DL (ref 0.2–1.3)
BILIRUB SERPL-MCNC: NEGATIVE MG/DL
BUN SERPL-MCNC: 8 MG/DL (ref 7–18)
CALCIUM SERPL-MCNC: 9.4 MG/DL (ref 8.5–10.1)
CHLORIDE SERPL-SCNC: 105 MMOL/L (ref 98–107)
CO2 SERPL-SCNC: 24.6 MMOL/L (ref 21–32)
COLOR UR: YELLOW
CREAT SERPL-MCNC: 0.8 MG/DL (ref 0.6–1.3)
EOSINOPHIL NFR BLD: 2.5 % (ref 0–7)
ERYTHROCYTE [DISTWIDTH] IN BLOOD BY AUTOMATED COUNT: 13 % (ref 11.5–14.5)
GLOBULIN SER-MCNC: 4.6 G/L
GLUCOSE SERPL-MCNC: 94 MG/DL (ref 74–106)
GLUCOSE SERPL-MCNC: NEGATIVE MG/DL
HCG SERPL-ACNC: 423 MIU/ML
HCG SERPL-ACNC: POSITIVE M[IU]/ML
HCT VFR BLD CALC: 41.9 % (ref 36–48)
HGB BLD-MCNC: 13.9 G/DL (ref 12–16)
IMM GRANULOCYTES NFR BLD: 0.1 % (ref 0–5)
KETONES UR STRIP-MCNC: NEGATIVE MG/DL
LYMPHOCYTES NFR BLD AUTO: 30.4 % (ref 15–50)
MCH RBC QN AUTO: 30.8 PG (ref 26–34)
MCHC RBC AUTO-ENTMCNC: 33.2 G/DL (ref 31–37)
MCV RBC: 92.9 FL (ref 80–100)
MONOCYTES NFR BLD: 8.3 % (ref 2–11)
NEUTROPHILS NFR BLD AUTO: 58.4 % (ref 40–80)
NITRITE UR-MCNC: NEGATIVE MG/ML
OSMOLALITY SERPL CALC.SUM OF ELEC: 278 MOSM/KG (ref 275–300)
PH UR STRIP: 5 [PH] (ref 5–6)
PLATELET # BLD: 327 10X3/UL (ref 130–400)
PMV BLD AUTO: 10.4 FL (ref 7.4–10.4)
POTASSIUM SERPL-SCNC: 3.9 MMOL/L (ref 3.5–5.1)
PROT SERPL-MCNC: 8.5 G/DL (ref 6.4–8.2)
PROT UR-MCNC: NEGATIVE MG/DL
RBC # BLD AUTO: 4.51 10X6/UL (ref 4–5.4)
RBC #/AREA URNS HPF: (no result) /HPF (ref 0–5)
SODIUM SERPL-SCNC: 141 MMOL/L (ref 136–145)
SP GR UR STRIP: 1.01 (ref 1–1.02)
UROBILINOGEN UR-MCNC: NORMAL MG/DL
WBC # BLD AUTO: 7.5 10X3/UL (ref 4.8–10.8)
WBC #/AREA URNS HPF: (no result) /HPF (ref 0–5)